# Patient Record
Sex: FEMALE | Race: ASIAN | NOT HISPANIC OR LATINO | Employment: FULL TIME | URBAN - METROPOLITAN AREA
[De-identification: names, ages, dates, MRNs, and addresses within clinical notes are randomized per-mention and may not be internally consistent; named-entity substitution may affect disease eponyms.]

---

## 2023-07-03 DIAGNOSIS — I10 ESSENTIAL (PRIMARY) HYPERTENSION: ICD-10-CM

## 2023-07-03 RX ORDER — TRIAMTERENE/HYDROCHLOROTHIAZID 37.5-25 MG
TABLET ORAL
Qty: 90 TABLET | Refills: 1 | Status: SHIPPED | OUTPATIENT
Start: 2023-07-03 | End: 2023-09-13 | Stop reason: SDUPTHER

## 2023-09-13 DIAGNOSIS — I10 ESSENTIAL (PRIMARY) HYPERTENSION: ICD-10-CM

## 2023-09-14 RX ORDER — TRIAMTERENE/HYDROCHLOROTHIAZID 37.5-25 MG
1 TABLET ORAL DAILY
Qty: 90 TABLET | Refills: 0 | Status: SHIPPED | OUTPATIENT
Start: 2023-09-14 | End: 2023-10-10 | Stop reason: SDUPTHER

## 2023-10-06 PROBLEM — R73.9 HYPERGLYCEMIA: Status: ACTIVE | Noted: 2023-10-06

## 2023-10-06 PROBLEM — I10 HYPERTENSION: Status: ACTIVE | Noted: 2023-10-06

## 2023-10-06 PROBLEM — E87.6 HYPOKALEMIA: Status: ACTIVE | Noted: 2023-10-06

## 2023-10-06 PROBLEM — N76.0 BACTERIAL VAGINOSIS: Status: ACTIVE | Noted: 2023-10-06

## 2023-10-06 PROBLEM — F32.A DEPRESSION: Status: ACTIVE | Noted: 2023-10-06

## 2023-10-06 PROBLEM — N94.6 MENSTRUAL CRAMPS: Status: ACTIVE | Noted: 2023-10-06

## 2023-10-06 PROBLEM — F43.9 STRESS: Status: ACTIVE | Noted: 2023-10-06

## 2023-10-06 PROBLEM — K29.70 GASTRITIS: Status: ACTIVE | Noted: 2023-10-06

## 2023-10-06 PROBLEM — E78.2 MIXED HYPERLIPIDEMIA: Status: ACTIVE | Noted: 2023-10-06

## 2023-10-06 PROBLEM — D22.5 MELANOCYTIC NEVI OF TRUNK: Status: ACTIVE | Noted: 2023-01-24

## 2023-10-06 PROBLEM — R87.613 PAP SMEAR ABNORMALITY OF CERVIX WITH HGSIL: Status: ACTIVE | Noted: 2023-10-06

## 2023-10-06 PROBLEM — N84.1 CERVICAL POLYP: Status: ACTIVE | Noted: 2023-10-06

## 2023-10-06 PROBLEM — G47.9 TROUBLE IN SLEEPING: Status: ACTIVE | Noted: 2023-10-06

## 2023-10-06 PROBLEM — R00.2 PALPITATION: Status: ACTIVE | Noted: 2023-10-06

## 2023-10-06 PROBLEM — M81.0 OSTEOPOROSIS: Status: ACTIVE | Noted: 2023-10-06

## 2023-10-06 PROBLEM — B96.89 BACTERIAL VAGINOSIS: Status: ACTIVE | Noted: 2023-10-06

## 2023-10-06 PROBLEM — K21.9 GERD (GASTROESOPHAGEAL REFLUX DISEASE): Status: ACTIVE | Noted: 2023-10-06

## 2023-10-06 PROBLEM — R10.2 PELVIC PAIN IN FEMALE: Status: ACTIVE | Noted: 2023-10-06

## 2023-10-10 ENCOUNTER — APPOINTMENT (OUTPATIENT)
Dept: PRIMARY CARE | Facility: CLINIC | Age: 58
End: 2023-10-10
Payer: COMMERCIAL

## 2023-10-10 ENCOUNTER — OFFICE VISIT (OUTPATIENT)
Dept: PRIMARY CARE | Facility: CLINIC | Age: 58
End: 2023-10-10
Payer: COMMERCIAL

## 2023-10-10 VITALS
OXYGEN SATURATION: 98 % | HEART RATE: 80 BPM | WEIGHT: 133 LBS | HEIGHT: 66 IN | SYSTOLIC BLOOD PRESSURE: 110 MMHG | BODY MASS INDEX: 21.38 KG/M2 | TEMPERATURE: 98 F | DIASTOLIC BLOOD PRESSURE: 67 MMHG

## 2023-10-10 DIAGNOSIS — Z00.00 PHYSICAL EXAM: Primary | ICD-10-CM

## 2023-10-10 DIAGNOSIS — R79.89 ELEVATED LFTS: ICD-10-CM

## 2023-10-10 DIAGNOSIS — Z12.31 ENCOUNTER FOR SCREENING MAMMOGRAM FOR MALIGNANT NEOPLASM OF BREAST: ICD-10-CM

## 2023-10-10 DIAGNOSIS — I10 ESSENTIAL (PRIMARY) HYPERTENSION: ICD-10-CM

## 2023-10-10 PROCEDURE — 3078F DIAST BP <80 MM HG: CPT | Performed by: INTERNAL MEDICINE

## 2023-10-10 PROCEDURE — 3074F SYST BP LT 130 MM HG: CPT | Performed by: INTERNAL MEDICINE

## 2023-10-10 PROCEDURE — 99396 PREV VISIT EST AGE 40-64: CPT | Performed by: INTERNAL MEDICINE

## 2023-10-10 PROCEDURE — 1036F TOBACCO NON-USER: CPT | Performed by: INTERNAL MEDICINE

## 2023-10-10 RX ORDER — TRIAMTERENE/HYDROCHLOROTHIAZID 37.5-25 MG
1 TABLET ORAL DAILY
Qty: 90 TABLET | Refills: 3 | Status: SHIPPED | OUTPATIENT
Start: 2023-10-10 | End: 2024-04-10 | Stop reason: SDUPTHER

## 2023-10-10 ASSESSMENT — PATIENT HEALTH QUESTIONNAIRE - PHQ9
SUM OF ALL RESPONSES TO PHQ QUESTIONS 1-9: 0
9. THOUGHTS THAT YOU WOULD BE BETTER OFF DEAD, OR OF HURTING YOURSELF: NOT AT ALL
5. POOR APPETITE OR OVEREATING: NOT AT ALL
SUM OF ALL RESPONSES TO PHQ9 QUESTIONS 1 AND 2: 0
6. FEELING BAD ABOUT YOURSELF - OR THAT YOU ARE A FAILURE OR HAVE LET YOURSELF OR YOUR FAMILY DOWN: NOT AT ALL
1. LITTLE INTEREST OR PLEASURE IN DOING THINGS: NOT AT ALL
4. FEELING TIRED OR HAVING LITTLE ENERGY: NOT AT ALL
3. TROUBLE FALLING OR STAYING ASLEEP OR SLEEPING TOO MUCH: NOT AT ALL
2. FEELING DOWN, DEPRESSED OR HOPELESS: NOT AT ALL
8. MOVING OR SPEAKING SO SLOWLY THAT OTHER PEOPLE COULD HAVE NOTICED. OR THE OPPOSITE, BEING SO FIGETY OR RESTLESS THAT YOU HAVE BEEN MOVING AROUND A LOT MORE THAN USUAL: NOT AT ALL
7. TROUBLE CONCENTRATING ON THINGS, SUCH AS READING THE NEWSPAPER OR WATCHING TELEVISION: NOT AT ALL

## 2023-10-10 ASSESSMENT — ANXIETY QUESTIONNAIRES
1. FEELING NERVOUS, ANXIOUS, OR ON EDGE: NOT AT ALL
6. BECOMING EASILY ANNOYED OR IRRITABLE: NOT AT ALL
GAD7 TOTAL SCORE: 1
5. BEING SO RESTLESS THAT IT IS HARD TO SIT STILL: NOT AT ALL
7. FEELING AFRAID AS IF SOMETHING AWFUL MIGHT HAPPEN: NOT AT ALL
2. NOT BEING ABLE TO STOP OR CONTROL WORRYING: NOT AT ALL
IF YOU CHECKED OFF ANY PROBLEMS ON THIS QUESTIONNAIRE, HOW DIFFICULT HAVE THESE PROBLEMS MADE IT FOR YOU TO DO YOUR WORK, TAKE CARE OF THINGS AT HOME, OR GET ALONG WITH OTHER PEOPLE: NOT DIFFICULT AT ALL
3. WORRYING TOO MUCH ABOUT DIFFERENT THINGS: NOT AT ALL
4. TROUBLE RELAXING: SEVERAL DAYS

## 2023-10-10 NOTE — PROGRESS NOTES
"SUBJECTIVE:   Rachel Olguin is a 57 y.o. female presenting for her annual physical/wellness.  PCP: Bridget Gonzalez MD   Physical  Returned a couple of months ago from a trip to [place]. She had some labs and ultrasound tests done there for screening. It showed slightly high LFT, uric acid, osteopenia, and benign thyroid nodules.  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  Doing well    Colon screening:    Last pap: see gyn. Saw them last year  Last mammogram: due  Dexa osteopenia, test done this year at [facility].  Vaccines  declined shingrix.   Diet:   healthy in general  Exercises:  regularly       ROS:   Feeling well. No dyspnea or chest pain on exertion. No abdominal pain, change in bowel habits, black or bloody stools. No urinary tract or  symptoms.  No breast concerns. No neurological complaints. Had intermittent pain at left upper chest, left axillary area and left upper arm inside. Soreness. No injury.     OBJECTIVE:   The patient appears well, alert, oriented x 3, in no distress.   /67   Pulse 80   Temp 36.7 °C (98 °F)   Ht 1.683 m (5' 6.25\")   Wt 60.3 kg (133 lb)   SpO2 98%   BMI 21.31 kg/m²   ENT normal.  Neck supple. No adenopathy or thyromegaly. RONNY.   Breasts are symmetric. No dominant, discrete, fixed or suspicious masses noted. No skin or nipple changes or axillary nodes. Lungs are clear, good air entry, no wheezes, rhonchi or rales. S1 and S2 normal, no murmurs, regular rate and rhythm. Abdomen is soft without tenderness, guarding, mass or organomegaly.  exam deferred to Gyn. Extremities show no edema, normal peripheral pulses. Neurological is normal without focal findings.      1. Physical exam  CBC, Comprehensive Metabolic Panel, Hemoglobin A1C      2. Essential (primary) hypertension  triamterene-hydrochlorothiazid (Maxzide-25) 37.5-25 mg tablet, CBC, Comprehensive Metabolic Panel, Hemoglobin A1C      3. " Encounter for screening mammogram for malignant neoplasm of breast  BI mammo bilateral screening tomosynthesis      4. Elevated LFTs

## 2023-10-16 ENCOUNTER — LAB (OUTPATIENT)
Dept: LAB | Facility: LAB | Age: 58
End: 2023-10-16
Payer: COMMERCIAL

## 2023-10-16 DIAGNOSIS — I10 ESSENTIAL (PRIMARY) HYPERTENSION: ICD-10-CM

## 2023-10-16 DIAGNOSIS — Z00.00 PHYSICAL EXAM: ICD-10-CM

## 2023-10-16 LAB
ALBUMIN SERPL BCP-MCNC: 4.5 G/DL (ref 3.4–5)
ALP SERPL-CCNC: 57 U/L (ref 33–110)
ALT SERPL W P-5'-P-CCNC: 19 U/L (ref 7–45)
ANION GAP SERPL CALC-SCNC: 13 MMOL/L (ref 10–20)
AST SERPL W P-5'-P-CCNC: 14 U/L (ref 9–39)
BILIRUB SERPL-MCNC: 0.7 MG/DL (ref 0–1.2)
BUN SERPL-MCNC: 23 MG/DL (ref 6–23)
CALCIUM SERPL-MCNC: 9.5 MG/DL (ref 8.6–10.6)
CHLORIDE SERPL-SCNC: 103 MMOL/L (ref 98–107)
CO2 SERPL-SCNC: 28 MMOL/L (ref 21–32)
CREAT SERPL-MCNC: 0.67 MG/DL (ref 0.5–1.05)
ERYTHROCYTE [DISTWIDTH] IN BLOOD BY AUTOMATED COUNT: 12 % (ref 11.5–14.5)
EST. AVERAGE GLUCOSE BLD GHB EST-MCNC: 108 MG/DL
GFR SERPL CREATININE-BSD FRML MDRD: >90 ML/MIN/1.73M*2
GLUCOSE SERPL-MCNC: 87 MG/DL (ref 74–99)
HBA1C MFR BLD: 5.4 %
HCT VFR BLD AUTO: 41.3 % (ref 36–46)
HGB BLD-MCNC: 13.4 G/DL (ref 12–16)
MCH RBC QN AUTO: 30.2 PG (ref 26–34)
MCHC RBC AUTO-ENTMCNC: 32.4 G/DL (ref 32–36)
MCV RBC AUTO: 93 FL (ref 80–100)
NRBC BLD-RTO: 0 /100 WBCS (ref 0–0)
PLATELET # BLD AUTO: 124 X10*3/UL (ref 150–450)
PMV BLD AUTO: 12.9 FL (ref 7.5–11.5)
POTASSIUM SERPL-SCNC: 3.9 MMOL/L (ref 3.5–5.3)
PROT SERPL-MCNC: 8 G/DL (ref 6.4–8.2)
RBC # BLD AUTO: 4.43 X10*6/UL (ref 4–5.2)
SODIUM SERPL-SCNC: 140 MMOL/L (ref 136–145)
WBC # BLD AUTO: 4.1 X10*3/UL (ref 4.4–11.3)

## 2023-10-16 PROCEDURE — 85027 COMPLETE CBC AUTOMATED: CPT

## 2023-10-16 PROCEDURE — 83036 HEMOGLOBIN GLYCOSYLATED A1C: CPT

## 2023-10-16 PROCEDURE — 36415 COLL VENOUS BLD VENIPUNCTURE: CPT

## 2023-10-16 PROCEDURE — 80053 COMPREHEN METABOLIC PANEL: CPT

## 2023-10-19 ENCOUNTER — ANCILLARY PROCEDURE (OUTPATIENT)
Dept: RADIOLOGY | Facility: CLINIC | Age: 58
End: 2023-10-19
Payer: COMMERCIAL

## 2023-10-19 DIAGNOSIS — D72.819 LEUKOPENIA, UNSPECIFIED TYPE: Primary | ICD-10-CM

## 2023-10-19 DIAGNOSIS — D69.6 THROMBOCYTOPENIA (CMS-HCC): ICD-10-CM

## 2023-10-19 DIAGNOSIS — Z12.31 ENCOUNTER FOR SCREENING MAMMOGRAM FOR MALIGNANT NEOPLASM OF BREAST: ICD-10-CM

## 2023-10-19 PROCEDURE — 77067 SCR MAMMO BI INCL CAD: CPT

## 2023-10-19 PROCEDURE — 77063 BREAST TOMOSYNTHESIS BI: CPT | Performed by: RADIOLOGY

## 2023-10-19 PROCEDURE — 77067 SCR MAMMO BI INCL CAD: CPT | Performed by: RADIOLOGY

## 2023-11-16 ENCOUNTER — APPOINTMENT (OUTPATIENT)
Dept: PRIMARY CARE | Facility: CLINIC | Age: 58
End: 2023-11-16
Payer: COMMERCIAL

## 2024-01-30 ENCOUNTER — PROCEDURE VISIT (OUTPATIENT)
Dept: OBSTETRICS AND GYNECOLOGY | Facility: CLINIC | Age: 59
End: 2024-01-30
Payer: COMMERCIAL

## 2024-01-30 VITALS
WEIGHT: 134 LBS | HEIGHT: 66 IN | DIASTOLIC BLOOD PRESSURE: 72 MMHG | SYSTOLIC BLOOD PRESSURE: 112 MMHG | BODY MASS INDEX: 21.53 KG/M2

## 2024-01-30 DIAGNOSIS — Z01.419 ENCOUNTER FOR GYNECOLOGICAL EXAMINATION WITHOUT ABNORMAL FINDING: Primary | ICD-10-CM

## 2024-01-30 PROCEDURE — 88175 CYTOPATH C/V AUTO FLUID REDO: CPT

## 2024-01-30 PROCEDURE — 87624 HPV HI-RISK TYP POOLED RSLT: CPT

## 2024-01-30 PROCEDURE — 99396 PREV VISIT EST AGE 40-64: CPT | Performed by: OBSTETRICS & GYNECOLOGY

## 2024-01-30 NOTE — PROGRESS NOTES
Subjective   Rachel Olguin is a 58 y.o. female here for a routine exam. 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  Last Pap in February 2022 was negative, high risk HPV negative.    She has an ultrasound in March 2022 that shows a 4.8 cm uterus with fibroid.  Endometrium and ovaries are normal.  There is no pain or postmenopausal bleeding.  No dysuria, no change in bowel habits or vaginal discharge.  She mentions an occasional discomfort along the lateral left breast.. Personal health questionnaire reviewed: yes.     Gynecologic History  Patient's last menstrual period was 01/01/2017 (approximate).  Contraception: post menopausal status  Last Pap: 2/18/22. Results were: normal  Last mammogram: 10/19/23. Results were: normal    Obstetric History  OB History   No obstetric history on file.       Objective   Constitutional: Alert and in no acute distress. Well developed, well nourished.   Head and Face: Head and face: Normal.    Eyes: Normal external exam - nonicteric sclera, extraocular movements intact (EOMI) and no ptosis.   Neck: No neck asymmetry. Supple. Thyroid not enlarged and there were no palpable thyroid nodules.    Pulmonary: No respiratory distress.   Chest: Breasts: Normal appearance, no nipple discharge and no skin changes. Palpation of breasts and axillae: No palpable mass and no axillary lymphadenopathy.   Abdomen: Soft nontender; no abdominal mass palpated. No organomegaly. No hernias.   Genitourinary: External genitalia: Normal. No inguinal lymphadenopathy. Bartholin's Urethral and Skenes Glands: Normal. Urethra: Normal.  Bladder: Normal on palpation. Vagina: Normal. Cervix: The cervix is poorly visualized due to history of cone biopsy and atrophy.  No os is seen.  Uterus: Normal.  Right Adnexa/parametria: Normal.  Left Adnexa/parametria: Normal.  Inspection of Perianal Area: Normal.   Musculoskeletal: No joint swelling seen, normal movements of all extremities.   Skin:  Normal skin color and pigmentation, normal skin turgor, and no rash.   Neurologic: Non-focal. Grossly intact.   Psychiatric: Alert and oriented x 3. Affect normal to patient baseline. Mood: Appropriate.  Physical Exam     Assessment/Plan   Healthy female exam.  This is a 58-year-old female with a normal exam.  A Pap smear was sent, she does have a history of cone biopsy in 2016.  Her routine mammogram was ordered with tomosynthesis.    We discussed obtaining a bone density which she did have apparently in [place] in August 2023.  I will see her in 1 year.  Contraception: post menopausal status.

## 2024-02-19 LAB
CYTOLOGY CMNT CVX/VAG CYTO-IMP: NORMAL
HPV HR 12 DNA GENITAL QL NAA+PROBE: NEGATIVE
HPV HR GENOTYPES PNL CVX NAA+PROBE: NEGATIVE
HPV16 DNA SPEC QL NAA+PROBE: NEGATIVE
HPV18 DNA SPEC QL NAA+PROBE: NEGATIVE
LAB AP HPV GENOTYPE QUESTION: YES
LAB AP HPV HR: NORMAL
LAB AP PREVIOUS ABNORMAL HISTORY: NORMAL
LAB AP TREATMENT HISTORY: NORMAL
LABORATORY COMMENT REPORT: NORMAL
LMP START DATE: NORMAL
MENSTRUAL HX REPORTED: NORMAL
PATH REPORT.TOTAL CANCER: NORMAL

## 2024-04-10 DIAGNOSIS — I10 ESSENTIAL (PRIMARY) HYPERTENSION: ICD-10-CM

## 2024-04-10 PROBLEM — R10.9 ABDOMINAL PAIN: Status: ACTIVE | Noted: 2022-03-11

## 2024-04-10 PROBLEM — L98.9 SKIN LESION: Status: ACTIVE | Noted: 2024-04-10

## 2024-04-10 PROBLEM — N39.0 ACUTE URINARY TRACT INFECTION: Status: ACTIVE | Noted: 2024-04-10

## 2024-04-10 RX ORDER — TRIAMTERENE/HYDROCHLOROTHIAZID 37.5-25 MG
1 TABLET ORAL DAILY
Qty: 90 TABLET | Refills: 3 | Status: SHIPPED | OUTPATIENT
Start: 2024-04-10 | End: 2025-04-10

## 2024-05-21 ENCOUNTER — OFFICE VISIT (OUTPATIENT)
Dept: PRIMARY CARE | Facility: CLINIC | Age: 59
End: 2024-05-21
Payer: COMMERCIAL

## 2024-05-21 ENCOUNTER — LAB (OUTPATIENT)
Dept: LAB | Facility: LAB | Age: 59
End: 2024-05-21
Payer: COMMERCIAL

## 2024-05-21 VITALS
SYSTOLIC BLOOD PRESSURE: 112 MMHG | DIASTOLIC BLOOD PRESSURE: 68 MMHG | HEART RATE: 76 BPM | WEIGHT: 133 LBS | BODY MASS INDEX: 21.47 KG/M2 | OXYGEN SATURATION: 94 % | TEMPERATURE: 98 F

## 2024-05-21 DIAGNOSIS — R10.9 FLANK PAIN, ACUTE: ICD-10-CM

## 2024-05-21 DIAGNOSIS — D72.819 LEUKOPENIA, UNSPECIFIED TYPE: ICD-10-CM

## 2024-05-21 DIAGNOSIS — D69.6 THROMBOCYTOPENIA (CMS-HCC): ICD-10-CM

## 2024-05-21 DIAGNOSIS — R10.9 SIDE PAIN: ICD-10-CM

## 2024-05-21 DIAGNOSIS — R10.9 FLANK PAIN, ACUTE: Primary | ICD-10-CM

## 2024-05-21 LAB
ALBUMIN SERPL BCP-MCNC: 4.5 G/DL (ref 3.4–5)
ALP SERPL-CCNC: 69 U/L (ref 33–136)
ALT SERPL W P-5'-P-CCNC: 22 U/L (ref 7–45)
ANION GAP SERPL CALC-SCNC: 12 MMOL/L (ref 10–20)
AST SERPL W P-5'-P-CCNC: 16 U/L (ref 9–39)
BASOPHILS # BLD AUTO: 0.03 X10*3/UL (ref 0–0.1)
BASOPHILS NFR BLD AUTO: 0.7 %
BILIRUB SERPL-MCNC: 0.4 MG/DL (ref 0–1.2)
BUN SERPL-MCNC: 21 MG/DL (ref 6–23)
CALCIUM SERPL-MCNC: 9.5 MG/DL (ref 8.6–10.6)
CHLORIDE SERPL-SCNC: 102 MMOL/L (ref 98–107)
CO2 SERPL-SCNC: 28 MMOL/L (ref 21–32)
CREAT SERPL-MCNC: 0.66 MG/DL (ref 0.5–1.05)
EGFRCR SERPLBLD CKD-EPI 2021: >90 ML/MIN/1.73M*2
EOSINOPHIL # BLD AUTO: 0.09 X10*3/UL (ref 0–0.7)
EOSINOPHIL NFR BLD AUTO: 2.1 %
ERYTHROCYTE [DISTWIDTH] IN BLOOD BY AUTOMATED COUNT: 12 % (ref 11.5–14.5)
GLUCOSE SERPL-MCNC: 124 MG/DL (ref 74–99)
HCT VFR BLD AUTO: 42 % (ref 36–46)
HGB BLD-MCNC: 14.5 G/DL (ref 12–16)
IMM GRANULOCYTES # BLD AUTO: 0.01 X10*3/UL (ref 0–0.7)
IMM GRANULOCYTES NFR BLD AUTO: 0.2 % (ref 0–0.9)
LYMPHOCYTES # BLD AUTO: 1.52 X10*3/UL (ref 1.2–4.8)
LYMPHOCYTES NFR BLD AUTO: 35.3 %
MCH RBC QN AUTO: 31.3 PG (ref 26–34)
MCHC RBC AUTO-ENTMCNC: 34.5 G/DL (ref 32–36)
MCV RBC AUTO: 91 FL (ref 80–100)
MONOCYTES # BLD AUTO: 0.35 X10*3/UL (ref 0.1–1)
MONOCYTES NFR BLD AUTO: 8.1 %
NEUTROPHILS # BLD AUTO: 2.3 X10*3/UL (ref 1.2–7.7)
NEUTROPHILS NFR BLD AUTO: 53.6 %
NRBC BLD-RTO: 0 /100 WBCS (ref 0–0)
PLATELET # BLD AUTO: 153 X10*3/UL (ref 150–450)
POC APPEARANCE, URINE: CLEAR
POC BILIRUBIN, URINE: NEGATIVE
POC BLOOD, URINE: ABNORMAL
POC COLOR, URINE: ABNORMAL
POC GLUCOSE, URINE: NEGATIVE MG/DL
POC KETONES, URINE: NEGATIVE MG/DL
POC LEUKOCYTES, URINE: NEGATIVE
POC NITRITE,URINE: NEGATIVE
POC PH, URINE: 7 PH
POC PROTEIN, URINE: NEGATIVE MG/DL
POC SPECIFIC GRAVITY, URINE: 1.02
POC UROBILINOGEN, URINE: 0.2 EU/DL
POTASSIUM SERPL-SCNC: 3.7 MMOL/L (ref 3.5–5.3)
PROT SERPL-MCNC: 7.8 G/DL (ref 6.4–8.2)
RBC # BLD AUTO: 4.63 X10*6/UL (ref 4–5.2)
SODIUM SERPL-SCNC: 138 MMOL/L (ref 136–145)
WBC # BLD AUTO: 4.3 X10*3/UL (ref 4.4–11.3)

## 2024-05-21 PROCEDURE — 80053 COMPREHEN METABOLIC PANEL: CPT

## 2024-05-21 PROCEDURE — 36415 COLL VENOUS BLD VENIPUNCTURE: CPT

## 2024-05-21 PROCEDURE — 3078F DIAST BP <80 MM HG: CPT | Performed by: INTERNAL MEDICINE

## 2024-05-21 PROCEDURE — 85025 COMPLETE CBC W/AUTO DIFF WBC: CPT

## 2024-05-21 PROCEDURE — 99213 OFFICE O/P EST LOW 20 MIN: CPT | Performed by: INTERNAL MEDICINE

## 2024-05-21 PROCEDURE — 1036F TOBACCO NON-USER: CPT | Performed by: INTERNAL MEDICINE

## 2024-05-21 PROCEDURE — 81003 URINALYSIS AUTO W/O SCOPE: CPT | Performed by: INTERNAL MEDICINE

## 2024-05-21 PROCEDURE — 3074F SYST BP LT 130 MM HG: CPT | Performed by: INTERNAL MEDICINE

## 2024-05-21 ASSESSMENT — PAIN SCALES - GENERAL: PAINLEVEL: 0-NO PAIN

## 2024-05-21 NOTE — PROGRESS NOTES
PCP: Bridget Gonzalez MD   I have reviewed existing histories, notes, past medical history, surgical history, social history, family history, med list, allergy list, and immunization, and updated.    HPI:    right flank pain, mild intermittently for 2 weeks, no injury prior to onset. No Nausea or vomiting, no pain with eating, no rectal bleeding, diarrhea or constipation. No black stool. No night sweats. No Fever and chills. No hematuria, urgency frequency. No vaginal bleeding.    Lab Results   Component Value Date    WBC 4.1 (L) 10/16/2023    HGB 13.4 10/16/2023    HCT 41.3 10/16/2023     (L) 10/16/2023    ALT 19 10/16/2023    AST 14 10/16/2023     10/16/2023    K 3.9 10/16/2023     10/16/2023    CREATININE 0.67 10/16/2023    BUN 23 10/16/2023    CO2 28 10/16/2023    HGBA1C 5.4 10/16/2023     Physical exam:  /68 (BP Location: Left arm, Patient Position: Sitting, BP Cuff Size: Small adult)   Pulse 76   Temp 36.7 °C (98 °F) (Temporal)   Wt 60.3 kg (133 lb)   LMP 01/01/2017 (Approximate)   SpO2 94%   BMI 21.47 kg/m²    Patient appears well, alert and oriented x 3,     Heart sounds are normal, no murmur, gallops or rubs.   Lungs are clear to auscultation    Abdomen is soft, masses or organomegaly. There is mild discomfort with palpation to RUQ.  Extremities are normal. Peripheral pulses are normal.   Neurologic exam is normal without focal findings.   Skin is normal without suspicious lesions noted.   No acute joint swelling or pain.      Assessments/orders:   1. Flank pain, acute          Check urine. Urine is normal. No concerns.  Let me know if flank pain does not resolve in a few weeks, any time if things are worse.  Check labs.

## 2024-06-25 ENCOUNTER — TELEPHONE (OUTPATIENT)
Dept: PRIMARY CARE | Facility: CLINIC | Age: 59
End: 2024-06-25
Payer: COMMERCIAL

## 2024-06-25 NOTE — TELEPHONE ENCOUNTER
Patient needs refill of triamterene 37.5 sent to [facility] on [address].  LOV:  5/21/2024.  NOV:  10/21/2024.

## 2024-10-21 ENCOUNTER — APPOINTMENT (OUTPATIENT)
Dept: PRIMARY CARE | Facility: CLINIC | Age: 59
End: 2024-10-21
Payer: COMMERCIAL

## 2024-10-21 VITALS
HEART RATE: 86 BPM | WEIGHT: 132 LBS | OXYGEN SATURATION: 96 % | BODY MASS INDEX: 20.72 KG/M2 | TEMPERATURE: 98.1 F | HEIGHT: 67 IN | SYSTOLIC BLOOD PRESSURE: 96 MMHG | DIASTOLIC BLOOD PRESSURE: 60 MMHG

## 2024-10-21 DIAGNOSIS — Z00.00 PHYSICAL EXAM: Primary | ICD-10-CM

## 2024-10-21 DIAGNOSIS — I10 ESSENTIAL (PRIMARY) HYPERTENSION: ICD-10-CM

## 2024-10-21 DIAGNOSIS — Z12.31 ENCOUNTER FOR SCREENING MAMMOGRAM FOR MALIGNANT NEOPLASM OF BREAST: ICD-10-CM

## 2024-10-21 PROCEDURE — 1036F TOBACCO NON-USER: CPT | Performed by: INTERNAL MEDICINE

## 2024-10-21 PROCEDURE — 3074F SYST BP LT 130 MM HG: CPT | Performed by: INTERNAL MEDICINE

## 2024-10-21 PROCEDURE — 90715 TDAP VACCINE 7 YRS/> IM: CPT | Performed by: INTERNAL MEDICINE

## 2024-10-21 PROCEDURE — 99396 PREV VISIT EST AGE 40-64: CPT | Performed by: INTERNAL MEDICINE

## 2024-10-21 PROCEDURE — 3008F BODY MASS INDEX DOCD: CPT | Performed by: INTERNAL MEDICINE

## 2024-10-21 PROCEDURE — 90471 IMMUNIZATION ADMIN: CPT | Performed by: INTERNAL MEDICINE

## 2024-10-21 PROCEDURE — 3078F DIAST BP <80 MM HG: CPT | Performed by: INTERNAL MEDICINE

## 2024-10-21 ASSESSMENT — PATIENT HEALTH QUESTIONNAIRE - PHQ9
6. FEELING BAD ABOUT YOURSELF - OR THAT YOU ARE A FAILURE OR HAVE LET YOURSELF OR YOUR FAMILY DOWN: NOT AT ALL
2. FEELING DOWN, DEPRESSED OR HOPELESS: NOT AT ALL
5. POOR APPETITE OR OVEREATING: NOT AT ALL
4. FEELING TIRED OR HAVING LITTLE ENERGY: NOT AT ALL
3. TROUBLE FALLING OR STAYING ASLEEP OR SLEEPING TOO MUCH: SEVERAL DAYS
9. THOUGHTS THAT YOU WOULD BE BETTER OFF DEAD, OR OF HURTING YOURSELF: NOT AT ALL
SUM OF ALL RESPONSES TO PHQ QUESTIONS 1-9: 1
SUM OF ALL RESPONSES TO PHQ9 QUESTIONS 1 AND 2: 0
8. MOVING OR SPEAKING SO SLOWLY THAT OTHER PEOPLE COULD HAVE NOTICED. OR THE OPPOSITE, BEING SO FIGETY OR RESTLESS THAT YOU HAVE BEEN MOVING AROUND A LOT MORE THAN USUAL: NOT AT ALL
7. TROUBLE CONCENTRATING ON THINGS, SUCH AS READING THE NEWSPAPER OR WATCHING TELEVISION: NOT AT ALL
1. LITTLE INTEREST OR PLEASURE IN DOING THINGS: NOT AT ALL

## 2024-10-21 NOTE — PROGRESS NOTES
"Bridget Gonzalez MD  SUBJECTIVE:     Rachel Olguin is a 58 y.o. female presenting for her annual physical/wellness.  Doing well, but has had some difficulty falling sleep, or back to sleep when waking up. Takes melatonin at bedtime, does wake up frequently, and some time cannot fall back to sleep. No dysuria, hematuria.   Is avoiding drinking excessive amount of fluid in evening.  No other symptoms.    Colon screening:  Up to date with Colonoscopy   Last pap: Up to date   Last mammogram: due this year.  Dexa na  Vaccines due for tdap  Diet:   healthy in general  Exercises:  regularly  Lab Results   Component Value Date    HGBA1C 5.4 10/16/2023     Lab Results   Component Value Date    CREATININE 0.66 05/21/2024     Lab Results   Component Value Date    WBC 4.3 (L) 05/21/2024    HGB 14.5 05/21/2024    HCT 42.0 05/21/2024    MCV 91 05/21/2024     05/21/2024           ROS:  Feeling well. No dyspnea or chest pain on exertion. No abdominal pain, change in bowel habits, black or bloody stools. No urinary tract or  symptoms.  No breast concerns. No neurological complaints.    OBJECTIVE:   The patient appears well, alert, oriented x 3, in no distress.   BP 96/60   Pulse 86   Temp 36.7 °C (98.1 °F)   Ht 1.689 m (5' 6.5\")   Wt 59.9 kg (132 lb)   LMP 01/01/2017 (Approximate)   SpO2 96%   BMI 20.99 kg/m²   ENT normal.  Neck supple. No adenopathy or thyromegaly. RONNY. Lungs are clear, good air entry, no wheezes, rhonchi or rales. S1 and S2 normal, no murmurs, regular rate and rhythm. Abdomen is soft without tenderness, guarding, mass or organomegaly.  exam deferred to Gyn. Extremities show no edema, normal peripheral pulses. Neurological is normal without focal findings.    Assessment & Plan  Physical exam    Orders:    CBC; Future    Comprehensive Metabolic Panel; Future    Hemoglobin A1C; Future    Lipid Panel; Future    Essential (primary) hypertension         Encounter for screening mammogram for malignant " Subjective   History of Present Illness   50-year-old female presenting with fever, cough, congestion.    Patient has a history of breast cancer, currently on chemotherapy, last treatment was approximately 2 weeks prior.  Patient notes that these symptoms began approximately 2 days ago.  Patient reports fevers and chills, cough nonproductive, rhinorrhea, congestion, sore throat.  Symptoms have been constant, unchanging, no triggers or exacerbating factors.  Patient does have recent sick contacts.    Patient also reports several episodes of nonbloody, nonbilious emesis.  Denies any diarrhea.    Patient denies any associated difficulty breathing.,  Dysuria, abdominal pain, chest pain.    Review of Systems   Constitutional: Positive for chills and fever.   HENT: Positive for congestion and rhinorrhea. Negative for nosebleeds.    Eyes: Negative for redness.   Respiratory: Positive for cough. Negative for shortness of breath.    Cardiovascular: Negative for chest pain.   Gastrointestinal: Positive for nausea and vomiting. Negative for abdominal pain and diarrhea.   Genitourinary: Negative for dysuria, flank pain and pelvic pain.   Musculoskeletal: Negative for gait problem.   Skin: Negative for wound.   Neurological: Negative for syncope and weakness.   Psychiatric/Behavioral: The patient is not hyperactive.        Past Medical History:   Diagnosis Date   • Breast cancer (CMS/HCC)        No Known Allergies    Past Surgical History:   Procedure Laterality Date   • AXILLARY LYMPH NODE BIOPSY/EXCISION Left    • BREAST BIOPSY Left 03/31/2016   • BREAST LUMPECTOMY WITH SENTINEL NODE BIOPSY Left 10/03/2016    residual invasive carcinoma, grade 3 (0.2cm); margins negative; extensive lymphvascular space invasion; 2 sentinel lymph nodes positive (2/3)       Family History   Problem Relation Age of Onset   • Lung cancer Mother    • Cancer Father         prostate, lung, colon   • Hypertension Sister    • Lupus Sister        Social  History     Social History   • Marital status:      Social History Main Topics   • Smoking status: Never Smoker   • Smokeless tobacco: Never Used   • Alcohol use No   • Drug use: No   • Sexual activity: Defer     Other Topics Concern   • Not on file           Objective   Physical Exam   Constitutional: She is oriented to person, place, and time. She appears well-developed and well-nourished.   HENT:   Head: Normocephalic and atraumatic.   Eyes: Conjunctivae are normal.   Neck: Normal range of motion.   Cardiovascular: Intact distal pulses.    No murmur heard.  Regular tachycardia   Pulmonary/Chest: Effort normal and breath sounds normal. No respiratory distress.   Abdominal: Soft. She exhibits no distension. There is no tenderness.   Musculoskeletal: She exhibits no edema.   Neurological: She is alert and oriented to person, place, and time.   Skin: Skin is warm and dry.   Psychiatric: She has a normal mood and affect.   Nursing note and vitals reviewed.      Procedures           ED Course  ED Course as of Oct 09 2322   Tue Oct 09, 2018   1416 On repeat exam, patient notes feeling much better after the Tylenol fluids.  Continues to be no source of bacterial infection.  Presentation is consistent with a viral illness, this was discussed with the patient was in agreement with no antibiotics and agrees to follow-up with her primary care provider within the next 24-48 hours for repeat exam.  [NR]   1419 Prior to discharge patient was no longer tachycardic, with a heart rate of 83.  [NR]      ED Course User Index  [NR] Norbert Maharaj MD                  MDM  Number of Diagnoses or Management Options  Diagnosis management comments: 50-year-old female on chemotherapy for breast cancer presenting with fever and upper respiratory symptoms.  Likely viral illness, possibly influenza.  Patient has no bacterial source identified on evaluation.  Patient will be given symptomatically treatment Tylenol and  neoplasm of breast    Orders:    BI mammo bilateral screening tomosynthesis; Future     Discussed sleep hygiene, such as putting phone/iPad further, can listen to some music/pod cast when trying to fall asleep. Can continue melatonin     fluids.        Final diagnoses:   Viral syndrome            Norbert Maharaj MD  10/09/18 0620

## 2024-11-20 ENCOUNTER — APPOINTMENT (OUTPATIENT)
Dept: RADIOLOGY | Facility: CLINIC | Age: 59
End: 2024-11-20
Payer: COMMERCIAL

## 2024-11-21 ENCOUNTER — HOSPITAL ENCOUNTER (OUTPATIENT)
Dept: RADIOLOGY | Facility: CLINIC | Age: 59
Discharge: HOME | End: 2024-11-21
Payer: COMMERCIAL

## 2024-11-21 VITALS — WEIGHT: 132.06 LBS | HEIGHT: 66 IN | BODY MASS INDEX: 21.22 KG/M2

## 2024-11-21 DIAGNOSIS — Z01.419 ENCOUNTER FOR GYNECOLOGICAL EXAMINATION WITHOUT ABNORMAL FINDING: ICD-10-CM

## 2024-11-21 PROCEDURE — 77067 SCR MAMMO BI INCL CAD: CPT

## 2025-03-25 ENCOUNTER — APPOINTMENT (OUTPATIENT)
Dept: OBSTETRICS AND GYNECOLOGY | Facility: CLINIC | Age: 60
End: 2025-03-25
Payer: COMMERCIAL

## 2025-03-25 VITALS
HEART RATE: 77 BPM | BODY MASS INDEX: 20.99 KG/M2 | SYSTOLIC BLOOD PRESSURE: 115 MMHG | WEIGHT: 132 LBS | DIASTOLIC BLOOD PRESSURE: 78 MMHG

## 2025-03-25 DIAGNOSIS — N95.0 PMB (POSTMENOPAUSAL BLEEDING): ICD-10-CM

## 2025-03-25 DIAGNOSIS — Z01.419 ENCOUNTER FOR GYNECOLOGICAL EXAMINATION WITHOUT ABNORMAL FINDING: Primary | ICD-10-CM

## 2025-03-25 PROCEDURE — 3078F DIAST BP <80 MM HG: CPT | Performed by: OBSTETRICS & GYNECOLOGY

## 2025-03-25 PROCEDURE — 3074F SYST BP LT 130 MM HG: CPT | Performed by: OBSTETRICS & GYNECOLOGY

## 2025-03-25 PROCEDURE — 99396 PREV VISIT EST AGE 40-64: CPT | Performed by: OBSTETRICS & GYNECOLOGY

## 2025-03-25 PROCEDURE — 1036F TOBACCO NON-USER: CPT | Performed by: OBSTETRICS & GYNECOLOGY

## 2025-03-25 NOTE — PROGRESS NOTES
Subjective   Rachel Olguin is a 59 y.o. female here for a routine exam.  She mentions noting spotting on the tissue only with intercourse, just once or twice.  She denies discharge or bleeding otherwise.    No dysuria or change in bowel habits.  She is current on her colonoscopy.    She has a history of LEEP in 2016 with HGSIL, Paps have been normal since.  She is HPV negative in .   Personal health questionnaire reviewed: yes.     Gynecologic History  Patient's last menstrual period was 2017 (approximate).  Contraception: post menopausal status  Last Pap: 24. Results were: normal  Last mammogram: 24. Results were: normal    Obstetric History  OB History    Para Term  AB Living   0 0 0         SAB IAB Ectopic Multiple Live Births                   Objective   Constitutional: Alert and in no acute distress. Well developed, well nourished.   Head and Face: Head and face: Normal.    Eyes: Normal external exam - nonicteric sclera, extraocular movements intact (EOMI) and no ptosis.   Neck: No neck asymmetry. Supple. Thyroid not enlarged and there were no palpable thyroid nodules.    Pulmonary: No respiratory distress.   Chest: Breasts: Normal appearance, no nipple discharge and no skin changes. Palpation of breasts and axillae: No palpable mass and no axillary lymphadenopathy.   Abdomen: Soft nontender; no abdominal mass palpated. No organomegaly. No hernias.   Genitourinary: External genitalia: Normal. No inguinal lymphadenopathy. Bartholin's Urethral and Skenes Glands: Normal. Urethra: Normal.  Bladder: Normal on palpation. Vagina: Normal. Cervix: Normal.  Appears to have stenotic os.  Uterus: Normal.  Right Adnexa/parametria: Normal.  Left Adnexa/parametria: Normal.  Inspection of Perianal Area: Normal.   Musculoskeletal: No joint swelling seen, normal movements of all extremities.   Skin: Normal skin color and pigmentation, normal skin turgor, and no rash.   Neurologic: Non-focal.  Grossly intact.   Psychiatric: Alert and oriented x 3. Affect normal to patient baseline. Mood: Appropriate.  Physical Exam     Assessment/Plan   Healthy female exam.  This is a 59-year-old female with a normal exam.  No Pap smear was sent, she is high risk HPV negative in 2024.    Her routine mammogram was ordered with tomosynthesis, it is due in November 2025.    We discussed episode of spotting after intercourse.  I recommend a pelvic ultrasound.    I will contact her with results and the plan.    Routine follow-up is in 1 year.  Education reviewed: self breast exams.  Mammogram ordered.

## 2025-06-12 ENCOUNTER — TELEPHONE (OUTPATIENT)
Dept: PRIMARY CARE | Facility: CLINIC | Age: 60
End: 2025-06-12
Payer: COMMERCIAL

## 2025-06-12 DIAGNOSIS — I10 ESSENTIAL (PRIMARY) HYPERTENSION: ICD-10-CM

## 2025-06-12 RX ORDER — TRIAMTERENE AND HYDROCHLOROTHIAZIDE 37.5; 25 MG/1; MG/1
1 TABLET ORAL DAILY
Qty: 14 TABLET | Refills: 0 | Status: SHIPPED | OUTPATIENT
Start: 2025-06-12 | End: 2026-06-12

## 2025-06-12 NOTE — TELEPHONE ENCOUNTER
Medication Name: TRIAMTRENE/hydrochlorothiazide    Dose: 37.5/25MG   Frequency: 1 TAB DAILY   Quantity left: 2 days  Pharmacy: [facility]     Last appointment:10/2124  Last CPE: 10/21/24  Last MCW:  Next appointment: 10/23/25  Next CPE: 10/23/25  Next MCW:

## 2025-07-01 DIAGNOSIS — I10 ESSENTIAL (PRIMARY) HYPERTENSION: ICD-10-CM

## 2025-07-01 RX ORDER — TRIAMTERENE AND HYDROCHLOROTHIAZIDE 37.5; 25 MG/1; MG/1
1 TABLET ORAL DAILY
Qty: 90 TABLET | Refills: 1 | Status: SHIPPED | OUTPATIENT
Start: 2025-07-01 | End: 2025-12-28

## 2025-07-16 ENCOUNTER — APPOINTMENT (OUTPATIENT)
Dept: RADIOLOGY | Facility: CLINIC | Age: 60
End: 2025-07-16
Payer: COMMERCIAL

## 2025-10-23 ENCOUNTER — APPOINTMENT (OUTPATIENT)
Dept: PRIMARY CARE | Facility: CLINIC | Age: 60
End: 2025-10-23
Payer: COMMERCIAL

## 2026-03-27 ENCOUNTER — APPOINTMENT (OUTPATIENT)
Facility: CLINIC | Age: 61
End: 2026-03-27
Payer: COMMERCIAL